# Patient Record
(demographics unavailable — no encounter records)

---

## 2018-02-19 NOTE — RAD
PA AND LATERAL CHEST:

 

Date:  02/19/18

 

HISTORY:  

Cough and congestion. 

 

COMPARISON:  

03/28/13. 

 

FINDINGS:

Heart size is within normal limits. There are atherosclerotic changes of the aorta. The lungs are ana
ar of infiltrates. 

 

IMPRESSION: 

No active intrathoracic disease. 

 

 

POS: [facility]